# Patient Record
Sex: FEMALE | Race: WHITE
[De-identification: names, ages, dates, MRNs, and addresses within clinical notes are randomized per-mention and may not be internally consistent; named-entity substitution may affect disease eponyms.]

---

## 2019-06-05 ENCOUNTER — HOSPITAL ENCOUNTER (INPATIENT)
Dept: HOSPITAL 92 - L&D/OP | Age: 24
LOS: 2 days | Discharge: HOME | End: 2019-06-07
Attending: OBSTETRICS & GYNECOLOGY | Admitting: OBSTETRICS & GYNECOLOGY
Payer: COMMERCIAL

## 2019-06-05 VITALS — BODY MASS INDEX: 24.6 KG/M2

## 2019-06-05 DIAGNOSIS — Z3A.39: ICD-10-CM

## 2019-06-05 DIAGNOSIS — O34.219: Primary | ICD-10-CM

## 2019-06-05 LAB
HBSAG INDEX: 0.25 S/CO (ref 0–0.99)
HGB BLD-MCNC: 12.7 G/DL (ref 12–16)
MCH RBC QN AUTO: 29.4 PG (ref 27–31)
MCV RBC AUTO: 86.3 FL (ref 78–98)
PLATELET # BLD AUTO: 219 THOU/UL (ref 130–400)
RBC # BLD AUTO: 4.31 MILL/UL (ref 4.2–5.4)
SYPHILIS ANTIBODY INDEX: 0.06 S/CO
WBC # BLD AUTO: 17.2 THOU/UL (ref 4.8–10.8)

## 2019-06-05 PROCEDURE — 86850 RBC ANTIBODY SCREEN: CPT

## 2019-06-05 PROCEDURE — 86900 BLOOD TYPING SEROLOGIC ABO: CPT

## 2019-06-05 PROCEDURE — 87340 HEPATITIS B SURFACE AG IA: CPT

## 2019-06-05 PROCEDURE — 86780 TREPONEMA PALLIDUM: CPT

## 2019-06-05 PROCEDURE — 51701 INSERT BLADDER CATHETER: CPT

## 2019-06-05 PROCEDURE — 85027 COMPLETE CBC AUTOMATED: CPT

## 2019-06-05 PROCEDURE — 36415 COLL VENOUS BLD VENIPUNCTURE: CPT

## 2019-06-05 PROCEDURE — 99285 EMERGENCY DEPT VISIT HI MDM: CPT

## 2019-06-05 PROCEDURE — 86901 BLOOD TYPING SEROLOGIC RH(D): CPT

## 2019-06-05 NOTE — PDOC.LDHP
Labor and Delivery H&P


Chief complaint: contractions


HPI: 





Patient was seen in office today this morning and had memebrane sweep.  She had 

some mild contractions following. they pickup up at 2pm.  Around 9pm their 

 came to the house where she was genoveva every 2-3 minutes.  They 

arrived to the hospital shortly there after. She denies LOF.  has had some 

bloody show.  Denies pain when she is not having contractions.  Reporting and 

active fetus.  


Current gestational age (weeks): 39 (4 days)


Due date: 19


Dating criteria: last menstrual period


Grav: 2


Para: 1


OB History Details: 





 in 2017.  PCS for indication of failed second stage, OP presentation. 

fetal macrosomia. post dates. #9lbs. 11oz


G2 Current pregnancy. 


Past Medical History: 





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 ( Father is 

affected)


Current medications: pre-latanya vitamins


Previous surgical history: low tranverse CS


Allergies/Adverse Reactions: 


 Allergies











Allergy/AdvReac Type Severity Reaction Status Date / Time


 


No Known Allergies Allergy   Unverified 17 11:38











Social history: none





- Physical Exam


Vital signs reviewed and normal: yes (135bpm baseline. +Accels.  no declerations

)


General: breathing through contractions


Heart: RRR


Lungs: nonlabored breathing


Abdomen: gravid


FHT: category 1





- Vaginal Exam


cm dilated: 6


Effacement: 90%


Station: -1





- OB Labs


Blood type: A


RH: positive


Antibody Screen: negative


HIV: negative


RPR: negative


HEPSAg: negative


1 hour GCT: unknown (Declined)


GBS: negative


Urine drug screen: negative


Rubella: immune





- Assessment


L&D Assessment: term patient in labor





- Plan


Plan: admit to L&D (consulted with Dr. Lozano. he is aware of TOLAC and 

indication for PCS.  Anesthesia was notified as well.)

## 2019-06-06 PROCEDURE — 0KQM0ZZ REPAIR PERINEUM MUSCLE, OPEN APPROACH: ICD-10-PCS | Performed by: ADVANCED PRACTICE MIDWIFE

## 2019-06-06 RX ADMIN — DOCUSATE CALCIUM SCH MG: 240 CAPSULE, LIQUID FILLED ORAL at 21:27

## 2019-06-06 RX ADMIN — DOCUSATE CALCIUM SCH MG: 240 CAPSULE, LIQUID FILLED ORAL at 08:54

## 2019-06-06 RX ADMIN — Medication PRN MLS: at 03:05

## 2019-06-06 RX ADMIN — Medication PRN MLS: at 04:31

## 2019-06-06 NOTE — PDOC.OPDEL
OB Operative/Delivery Note


Delivery Dr/Surgeon: SEJAL Acevedo 


Pre-Delivery Diagnosis: active labor


Procedure/Post Delivery Dx: vaginal delivery after CS


Weeks gestation: 39


Anesthesia: none





- Findings


  ** A


Sex: male


Weight: 8 lb 9 oz


Apgar - 1 min: 8


Apgar - 5 min: 9





- Additional Findings/Plan


Placenta delivered: spontaneous


Repaired Obstetrical Laceration: 2nd degree


Estimated blood loss: 300mL


Compilations/Other Findings: 





Nuchal cord, tight.


OP presentation 


Post delivery plan: routine recovery

## 2019-06-07 VITALS — TEMPERATURE: 97.9 F | DIASTOLIC BLOOD PRESSURE: 59 MMHG | SYSTOLIC BLOOD PRESSURE: 103 MMHG

## 2019-06-07 RX ADMIN — DOCUSATE CALCIUM SCH MG: 240 CAPSULE, LIQUID FILLED ORAL at 09:36

## 2019-06-07 NOTE — PDOC.PP
Post Partum Progress Note


Post Partum Day #: 1


Subjective: 





Doing well.  Much better at moving around now. 


PO intake tolerated: yes


Flatus: yes


Ambulation: yes


 Vital Signs (12 hours)











  Temp Pulse Resp BP Pulse Ox


 


 19 08:00  97.9 F  75  14  103/59 L  98








 Weight











Weight                         148 lb

















- Physical Examination


General: NAD


Cardiovascular: no m/r/g, RRR


Respiratory: clear to auscultation bilaterally, non-labored breathing


Abdominal: + bowel sounds, lochia (minimal)


Fundus firm & at: -1


Extremities: negative homans (B)


Perineum: Repair intact. edema is less


Neurological: no gross focal deficits


Psychiatric: A&Ox3


Result Diagrams: 


 19 22:25





Additional Labs: 


 Post Partum Labs











Blood Type  A POSITIVE   19  22:25    


 


Hep Bs Antigen  Non-Reactive S/CO (NonReactive)   19  22:25    











(1) 39 weeks gestation of pregnancy


Code(s): Z3A.39 - 39 WEEKS GESTATION OF PREGNANCY   Status: Acute   





(2) Second degree perineal laceration


Code(s): O70.1 - SECOND DEGREE PERINEAL LACERATION DURING DELIVERY   Status: 

Acute   





(3) Vaginal birth after  ()


Code(s): O34.219 - MATERNAL CARE FOR UNSP TYPE SCAR FROM PREVIOUS  DEL 

  Status: Acute   





- Assessment/Plan





A:  G2 Now P2 SP  at 39 w 5 days. 


P: Routine  care